# Patient Record
Sex: FEMALE | Race: WHITE | NOT HISPANIC OR LATINO | URBAN - METROPOLITAN AREA
[De-identification: names, ages, dates, MRNs, and addresses within clinical notes are randomized per-mention and may not be internally consistent; named-entity substitution may affect disease eponyms.]

---

## 2024-03-21 ENCOUNTER — NURSING HOME VISIT (OUTPATIENT)
Dept: POST ACUTE CARE | Facility: EXTERNAL LOCATION | Age: 74
End: 2024-03-21
Payer: COMMERCIAL

## 2024-03-21 DIAGNOSIS — R62.7 FAILURE TO THRIVE IN ADULT: ICD-10-CM

## 2024-03-21 DIAGNOSIS — R79.89 LOW SERUM T4 LEVEL: ICD-10-CM

## 2024-03-21 DIAGNOSIS — F32.4 MAJOR DEPRESSIVE DISORDER IN PARTIAL REMISSION, UNSPECIFIED WHETHER RECURRENT (CMS-HCC): Primary | ICD-10-CM

## 2024-03-21 DIAGNOSIS — F41.9 SEVERE ANXIETY: ICD-10-CM

## 2024-03-21 DIAGNOSIS — R31.9 URINARY TRACT INFECTION WITH HEMATURIA, SITE UNSPECIFIED: ICD-10-CM

## 2024-03-21 DIAGNOSIS — N39.0 URINARY TRACT INFECTION WITH HEMATURIA, SITE UNSPECIFIED: ICD-10-CM

## 2024-03-21 PROCEDURE — 99305 1ST NF CARE MODERATE MDM 35: CPT | Performed by: EMERGENCY MEDICINE

## 2024-03-21 PROCEDURE — 99497 ADVNCD CARE PLAN 30 MIN: CPT | Performed by: EMERGENCY MEDICINE

## 2024-03-21 NOTE — LETTER
Patient: Gracy Lion  : 1950    Encounter Date: 2024    Gracy Lion   73 y.o.  female  @location@            Assessment and Plan:  History and physical    Failure to thrive in adult R62.7    Depressive disorder, severe, recurrent, rule out bipolar disorder  Anxiety, unspecified    UTI (urinary tract infection) N39.0    Low serum T4 level R79.89    Hospital Course:  The patient was admitted to the [facility] and was involved in individual and group therapies, recreational and occupational therapies.      Mallory has shown incremental improvement throughout the course of her hospitalization.  Early on, she was frequently tearful.  She expressed concerns about the loss of her  a year ago and frequent urinary incontinence.  She was upset because she was not ambulating well.  She talked about being afraid to fall.  She has been worried about not only hurting herself, but hurting a staff members during a fall.    Mallory was admitted taking multiple medications for her depression.  Wellbutrin was discontinued due to lack of efficacy.  She has remained on Lexapro as she feels this has been most beneficial.  Hydroxyzine was discontinued.  She has been started on Remeron and has done well with this.  Additionally, her Abilify dosing has been reduced to 2 mg for augmentation of antidepressant medications.  With the changes in medications, Mallory has done better.  She is quite bright when out of her room and amongst peers.  She smiles, laughs and socializes.  She attends activities.  She still has some periods of tearfulness on occasion, but this has been much less.  Overall, she has been ambulating much better and has been getting stronger.    In the absence of any suicidality and any unmanageable behaviors, arrangements have been made for Mallory to go to [facility] for skilled nursing care.  Throughout the course of her hospitalization, she has denied any suicidal  thoughts.  She has reported her [employer] as an important protective factor protective factor.  Female gender, discharge to a supportive environment, no history of suicide attempts, no psychosis, no current substance abuse issues and so forth also service protective factors.    Medication List:  Abilify 2 mg oral tablet 2 mg = 1 tabs, ORAL, DAILY  acetaminophen 500 mg oral tablet 1,000 mg = 2 tabs, PRN, ORAL, [id number]  Acidophilus oral capsule 1 caps, ORAL, DAILY  Aspercreme with Lidocaine 4% topical cream 1 rosaline, PRN, Topical, BID  Aspirin EC 81 mg oral delayed release tablet 81 mg = 1 tabs, ORAL, DAILY  atenolol 50 mg oral tablet 50 mg = 1 tabs, ORAL, DAILY  bisacodyl 10 mg rectal suppository 10 mg = 1 supp, PRN, Rectal, DAILY  Colace 100 mg oral capsule 100 mg = 1 caps, PRN, ORAL, DAILY  Flonase 50 mcg/inh nasal spray 1 sprays, PRN, Nasal, DAILY  Klor-Con M20 oral tablet, extended release 20 mEq = 1 tabs, ORAL, DAILY  Lasix 20 mg oral tablet 20 mg = 1 tabs, ORAL, BID  latanoprost 0.005% ophthalmic solution 1 drops, Left Eye, QHS  Lexapro 20 mg oral tablet 20 mg = 1 tabs, ORAL, DAILY  Lopid 600 mg oral tablet 600 mg = 1 tabs, ORAL, DAILY  Melatonin 3 mg oral tablet 6 mg = 2 tabs, ORAL, QHS  nystatin 100,000 units/g topical powder 1 rosaline, Topical, BID  omeprazole 20 mg oral delayed release capsule 20 mg = 1 caps, ORAL, DAILY  Percocet 5 mg-325 mg oral tablet 1 tabs, PRN, ORAL, BID  Preparation H Hydrocortisone 1% topical cream 1 rosaline, PRN, Topical, QID  Reguloid 1 tsps, ORAL, DAILY  Remeron 15 mg oral tablet 15 mg = 1 tabs, ORAL, QHS  Secura Mosturizing topical cream 1 rosaline, Topical, QSHIFT  Secura Mosturizing topical cream 1 rosaline, Topical, PRN  Zestril 10 mg oral tablet 10 mg = 1 tabs, ORAL, DAILY  Zocor 10 mg oral tablet 10 mg = 1 tabs, ORAL, DAILY    I discussed advanced care planning including the explanation and discussion of advanced directives. If patient does not have current up to date documents,  examples and information provided on how to create both living will and power of . Patient was encouraged to work on completing these documents.  Information and advise was also provided on DO NOT RESUSCITATE and patient encouraged to consider this  Patient is not sure about DNR at this time.  CODE STATUS is on file with the facility    Source of history: Nurse, Medical personnel, Medical record, Patient.  History limitation: None.    HPI:  History and physical    Patient is unable to give any detailed history and therefore history is obtained from the chart  No acute complaints voiced by the patient or acute concerns raised by nursing    History of hospitalization-    History of Present Illness This is a 72 y/o female with a h/o UTI, HTN, hyperlipidemia, depression, failure to thrive, ESBL, who presents to the ED via EMS for failure to thrive pta. Patient is also being brought for weakness for over the past week, patient has been having a hard time recently because of her anniversary with her . Patient states a loss for appetite but denies any pain. Patient denies weight loss and states she is urinating and having okay BM. Patient denies SI. She states she's being treated for her breast rashes. Patient voices no other complaints.     Problem List/Past Medical History Ongoing Abdominal pain Acid reflux Acquired spondylolisthesis Allergy to substance Anemia Anxiety Aortocoronary bypass graft present Arthritis Cervical spondylosis with myelopathy Chronic back pain Chronic pain disorder Chronic right sacroiliac joint pain Coronary artery disease Depression with anxiety Diarrhea Diarrhea Dysphagia ESBL Escherichia coli Essential hypertension Excess skin of eyelid Fatigue Gallbladder disease Hyperlipidemia Hypertensive disorder Hypothyroidism Leg swelling Long term prescription opiate use Lower extremity weakness Lumbar spondylosis Noninfectious enteritis Old myocardial infarction Pain in limb Palliative  care encounter Spinal stenosis of lumbar region Urge incontinence of urine Urinary incontinence Urinary tract infectious disease Vitamin D deficiency Procedure/Surgical History   CYSTOSCOPY,INJECTION OF BOTOX INTO BLADDER (10/18/2021)   Cystoscopy with Collagen Injection. (2021)   CYSTOSCOPY, INJECTION OF BOTOX INTO BLADDER (2021)   CYSTOSCOPY, BILATERAL RETROGRADE PYELOGRAMS, URETHRAL DILATION (2021)   Cholecystectomy; (2017)   Cornea Transplant right eye (2016)   Toe surgery artfical joint   left knee replacement   bypass heart surgery   Cervical fusion  Adhesive tape allergy RASH No Known Medication Allergies lactose Social History   Alcohol - Denies Alcohol Use   Exercise - Occasional exercise   Home/Environment Lives with:Spouse *Living Situation Prior to AdmissionHome/Independent Home equipment:Walker/Cane Monitoring Equipment in homeNone *Special Services and Community Resources Prior to AdmissionNone *Mobility Assistance Prior to AdmissionIndependent Home BarriersFalls *Will patient require additional/new services upon discharge?Yes   Other Name:Personal safety: feels safe at home   Sexual - No Sexual Activity   Substance Abuse - Denies Substance Abuse   Tobacco Use:Former smoker, quit more than 30 days ago Family History Heart disease: Mother and Father. Health Status Family Member(s)  Family Member(s) Relationship: Father, Age: Unknown Relationship: Mother, Age: Unknown      Physical Exam:  Vital signs as per nursing/MA documentation were reviewed  General appearance: Alert and in no acute distress  HEENT: Normal Inspection  Neck - Normal Inspection  Respiratory : No respiratory distress. Lungs are clear   Cardiovascular: heart rate normal. No gallop  Back - normal inspection  Skin inspection:Warm  Musculoskeletal : No deformities  Neuro : Limited exam. Baseline    ROS: Review of symptoms is negative except for what is mentioned in HPI    Results/Data  Records including but  not limited to electronic medical records, relevant lab work and imaging from patient's health care facility encounter were reviewed and independently verified      Charting was completed using voice recognition technology and may include unintended errors.    Discussed with patient/family, RN, and NP.      Electronically Signed By: Milind Schroeder MD   3/21/24  5:58 PM

## 2024-03-21 NOTE — PROGRESS NOTES
Gracy Lion   73 y.o.  female  @location@            Assessment and Plan:  History and physical    Failure to thrive in adult R62.7    Depressive disorder, severe, recurrent, rule out bipolar disorder  Anxiety, unspecified    UTI (urinary tract infection) N39.0    Low serum T4 level R79.89    Hospital Course:  The patient was admitted to the [facility] Unit and was involved in individual and group therapies, recreational and occupational therapies.      Mallory has shown incremental improvement throughout the course of her hospitalization.  Early on, she was frequently tearful.  She expressed concerns about the loss of her  a year ago and frequent urinary incontinence.  She was upset because she was not ambulating well.  She talked about being afraid to fall.  She has been worried about not only hurting herself, but hurting a staff members during a fall.    Mallory was admitted taking multiple medications for her depression.  Wellbutrin was discontinued due to lack of efficacy.  She has remained on Lexapro as she feels this has been most beneficial.  Hydroxyzine was discontinued.  She has been started on Remeron and has done well with this.  Additionally, her Abilify dosing has been reduced to 2 mg for augmentation of antidepressant medications.  With the changes in medications, Mallory has done better.  She is quite bright when out of her room and amongst peers.  She smiles, laughs and socializes.  She attends activities.  She still has some periods of tearfulness on occasion, but this has been much less.  Overall, she has been ambulating much better and has been getting stronger.    In the absence of any suicidality and any unmanageable behaviors, arrangements have been made for Mallory to go to [facility] for skilled nursing care.  Throughout the course of her hospitalization, she has denied any suicidal thoughts.  She has reported her [employer] as an important protective  factor protective factor.  Female gender, discharge to a supportive environment, no history of suicide attempts, no psychosis, no current substance abuse issues and so forth also service protective factors.    Medication List:  Abilify 2 mg oral tablet 2 mg = 1 tabs, ORAL, DAILY  acetaminophen 500 mg oral tablet 1,000 mg = 2 tabs, PRN, ORAL, [id number]  Acidophilus oral capsule 1 caps, ORAL, DAILY  Aspercreme with Lidocaine 4% topical cream 1 rosaline, PRN, Topical, BID  Aspirin EC 81 mg oral delayed release tablet 81 mg = 1 tabs, ORAL, DAILY  atenolol 50 mg oral tablet 50 mg = 1 tabs, ORAL, DAILY  bisacodyl 10 mg rectal suppository 10 mg = 1 supp, PRN, Rectal, DAILY  Colace 100 mg oral capsule 100 mg = 1 caps, PRN, ORAL, DAILY  Flonase 50 mcg/inh nasal spray 1 sprays, PRN, Nasal, DAILY  Klor-Con M20 oral tablet, extended release 20 mEq = 1 tabs, ORAL, DAILY  Lasix 20 mg oral tablet 20 mg = 1 tabs, ORAL, BID  latanoprost 0.005% ophthalmic solution 1 drops, Left Eye, QHS  Lexapro 20 mg oral tablet 20 mg = 1 tabs, ORAL, DAILY  Lopid 600 mg oral tablet 600 mg = 1 tabs, ORAL, DAILY  Melatonin 3 mg oral tablet 6 mg = 2 tabs, ORAL, QHS  nystatin 100,000 units/g topical powder 1 rosaline, Topical, BID  omeprazole 20 mg oral delayed release capsule 20 mg = 1 caps, ORAL, DAILY  Percocet 5 mg-325 mg oral tablet 1 tabs, PRN, ORAL, BID  Preparation H Hydrocortisone 1% topical cream 1 rosaline, PRN, Topical, QID  Reguloid 1 tsps, ORAL, DAILY  Remeron 15 mg oral tablet 15 mg = 1 tabs, ORAL, QHS  Secura Mosturizing topical cream 1 rosaline, Topical, QSHIFT  Secura Mosturizing topical cream 1 rosaline, Topical, PRN  Zestril 10 mg oral tablet 10 mg = 1 tabs, ORAL, DAILY  Zocor 10 mg oral tablet 10 mg = 1 tabs, ORAL, DAILY    I discussed advanced care planning including the explanation and discussion of advanced directives. If patient does not have current up to date documents, examples and information provided on how to create both living will and power of  . Patient was encouraged to work on completing these documents.  Information and advise was also provided on DO NOT RESUSCITATE and patient encouraged to consider this  Patient is not sure about DNR at this time.  CODE STATUS is on file with the facility    Source of history: Nurse, Medical personnel, Medical record, Patient.  History limitation: None.    HPI:  History and physical    Patient is unable to give any detailed history and therefore history is obtained from the chart  No acute complaints voiced by the patient or acute concerns raised by nursing    History of hospitalization-    History of Present Illness This is a 72 y/o female with a h/o UTI, HTN, hyperlipidemia, depression, failure to thrive, ESBL, who presents to the ED via EMS for failure to thrive pta. Patient is also being brought for weakness for over the past week, patient has been having a hard time recently because of her anniversary with her . Patient states a loss for appetite but denies any pain. Patient denies weight loss and states she is urinating and having okay BM. Patient denies SI. She states she's being treated for her breast rashes. Patient voices no other complaints.     Problem List/Past Medical History Ongoing Abdominal pain Acid reflux Acquired spondylolisthesis Allergy to substance Anemia Anxiety Aortocoronary bypass graft present Arthritis Cervical spondylosis with myelopathy Chronic back pain Chronic pain disorder Chronic right sacroiliac joint pain Coronary artery disease Depression with anxiety Diarrhea Diarrhea Dysphagia ESBL Escherichia coli Essential hypertension Excess skin of eyelid Fatigue Gallbladder disease Hyperlipidemia Hypertensive disorder Hypothyroidism Leg swelling Long term prescription opiate use Lower extremity weakness Lumbar spondylosis Noninfectious enteritis Old myocardial infarction Pain in limb Palliative care encounter Spinal stenosis of lumbar region Urge incontinence of urine Urinary  incontinence Urinary tract infectious disease Vitamin D deficiency Procedure/Surgical History   CYSTOSCOPY,INJECTION OF BOTOX INTO BLADDER (10/18/2021)   Cystoscopy with Collagen Injection. (2021)   CYSTOSCOPY, INJECTION OF BOTOX INTO BLADDER (2021)   CYSTOSCOPY, BILATERAL RETROGRADE PYELOGRAMS, URETHRAL DILATION (2021)   Cholecystectomy; (2017)   Cornea Transplant right eye (2016)   Toe surgery artfical joint   left knee replacement   bypass heart surgery   Cervical fusion  Adhesive tape allergy RASH No Known Medication Allergies lactose Social History   Alcohol - Denies Alcohol Use   Exercise - Occasional exercise   Home/Environment Lives with:Spouse *Living Situation Prior to AdmissionHome/Independent Home equipment:Walker/Cane Monitoring Equipment in homeNone *Special Services and Community Resources Prior to AdmissionNone *Mobility Assistance Prior to AdmissionIndependent Home BarriersFalls *Will patient require additional/new services upon discharge?Yes   Other Name:Personal safety: feels safe at home   Sexual - No Sexual Activity   Substance Abuse - Denies Substance Abuse   Tobacco Use:Former smoker, quit more than 30 days ago Family History Heart disease: Mother and Father. Health Status Family Member(s)  Family Member(s) Relationship: Father, Age: Unknown Relationship: Mother, Age: Unknown      Physical Exam:  Vital signs as per nursing/MA documentation were reviewed  General appearance: Alert and in no acute distress  HEENT: Normal Inspection  Neck - Normal Inspection  Respiratory : No respiratory distress. Lungs are clear   Cardiovascular: heart rate normal. No gallop  Back - normal inspection  Skin inspection:Warm  Musculoskeletal : No deformities  Neuro : Limited exam. Baseline    ROS: Review of symptoms is negative except for what is mentioned in HPI    Results/Data  Records including but not limited to electronic medical records, relevant lab work and imaging from  patient's health care facility encounter were reviewed and independently verified      Charting was completed using voice recognition technology and may include unintended errors.    Discussed with patient/family, RN, and NP.

## 2024-06-18 ENCOUNTER — LAB REQUISITION (OUTPATIENT)
Dept: LAB | Facility: HOSPITAL | Age: 74
End: 2024-06-18
Payer: MEDICARE

## 2024-06-18 DIAGNOSIS — R62.7 ADULT FAILURE TO THRIVE: ICD-10-CM

## 2024-06-18 DIAGNOSIS — M48.00 SPINAL STENOSIS, SITE UNSPECIFIED: ICD-10-CM

## 2024-06-18 DIAGNOSIS — M51.36 OTHER INTERVERTEBRAL DISC DEGENERATION, LUMBAR REGION: ICD-10-CM

## 2024-06-18 DIAGNOSIS — F33.2 MAJOR DEPRESSIVE DISORDER, RECURRENT SEVERE WITHOUT PSYCHOTIC FEATURES (MULTI): ICD-10-CM

## 2024-06-18 DIAGNOSIS — F41.9 ANXIETY DISORDER, UNSPECIFIED: ICD-10-CM

## 2024-06-18 DIAGNOSIS — M43.22 FUSION OF SPINE, CERVICAL REGION: ICD-10-CM

## 2024-06-18 DIAGNOSIS — K21.9 GASTRO-ESOPHAGEAL REFLUX DISEASE WITHOUT ESOPHAGITIS: ICD-10-CM

## 2024-06-18 LAB
ANION GAP SERPL CALC-SCNC: 12 MMOL/L (ref 10–20)
BUN SERPL-MCNC: 18 MG/DL (ref 6–23)
CALCIUM SERPL-MCNC: 9 MG/DL (ref 8.6–10.3)
CHLORIDE SERPL-SCNC: 106 MMOL/L (ref 98–107)
CHOLEST SERPL-MCNC: 153 MG/DL (ref 0–199)
CHOLESTEROL/HDL RATIO: 4.6
CO2 SERPL-SCNC: 25 MMOL/L (ref 21–32)
CREAT SERPL-MCNC: 0.64 MG/DL (ref 0.5–1.05)
EGFRCR SERPLBLD CKD-EPI 2021: >90 ML/MIN/1.73M*2
ERYTHROCYTE [DISTWIDTH] IN BLOOD BY AUTOMATED COUNT: 13.6 % (ref 11.5–14.5)
EST. AVERAGE GLUCOSE BLD GHB EST-MCNC: 111 MG/DL
GLUCOSE SERPL-MCNC: 90 MG/DL (ref 74–99)
HBA1C MFR BLD: 5.5 %
HCT VFR BLD AUTO: 39.2 % (ref 36–46)
HDLC SERPL-MCNC: 33 MG/DL
HGB BLD-MCNC: 13.4 G/DL (ref 12–16)
LDLC SERPL CALC-MCNC: 90 MG/DL
MCH RBC QN AUTO: 29.6 PG (ref 26–34)
MCHC RBC AUTO-ENTMCNC: 34.2 G/DL (ref 32–36)
MCV RBC AUTO: 87 FL (ref 80–100)
NON HDL CHOLESTEROL: 120 MG/DL (ref 0–149)
NRBC BLD-RTO: 0 /100 WBCS (ref 0–0)
PLATELET # BLD AUTO: 164 X10*3/UL (ref 150–450)
POTASSIUM SERPL-SCNC: 4.3 MMOL/L (ref 3.5–5.3)
RBC # BLD AUTO: 4.52 X10*6/UL (ref 4–5.2)
SODIUM SERPL-SCNC: 139 MMOL/L (ref 136–145)
TRIGL SERPL-MCNC: 149 MG/DL (ref 0–149)
TSH SERPL-ACNC: 2.39 MIU/L (ref 0.44–3.98)
VLDL: 30 MG/DL (ref 0–40)
WBC # BLD AUTO: 8.6 X10*3/UL (ref 4.4–11.3)

## 2024-06-18 PROCEDURE — 84443 ASSAY THYROID STIM HORMONE: CPT | Mod: OUT | Performed by: NURSE PRACTITIONER

## 2024-06-18 PROCEDURE — 85027 COMPLETE CBC AUTOMATED: CPT | Mod: OUT | Performed by: NURSE PRACTITIONER

## 2024-06-18 PROCEDURE — 80048 BASIC METABOLIC PNL TOTAL CA: CPT | Mod: OUT | Performed by: NURSE PRACTITIONER

## 2024-06-18 PROCEDURE — 82306 VITAMIN D 25 HYDROXY: CPT | Mod: OUT,PARLAB | Performed by: NURSE PRACTITIONER

## 2024-06-18 PROCEDURE — 83036 HEMOGLOBIN GLYCOSYLATED A1C: CPT | Mod: OUT | Performed by: NURSE PRACTITIONER

## 2024-06-18 PROCEDURE — 80061 LIPID PANEL: CPT | Mod: OUT | Performed by: NURSE PRACTITIONER

## 2024-06-19 LAB — 25(OH)D3 SERPL-MCNC: 38 NG/ML (ref 30–100)

## 2024-12-23 ENCOUNTER — APPOINTMENT (OUTPATIENT)
Dept: VASCULAR MEDICINE | Facility: HOSPITAL | Age: 74
End: 2024-12-23
Payer: MEDICARE

## 2024-12-23 ENCOUNTER — HOSPITAL ENCOUNTER (EMERGENCY)
Facility: HOSPITAL | Age: 74
Discharge: HOME | End: 2024-12-23
Attending: GENERAL PRACTICE
Payer: MEDICARE

## 2024-12-23 VITALS
SYSTOLIC BLOOD PRESSURE: 122 MMHG | TEMPERATURE: 97.9 F | HEART RATE: 60 BPM | RESPIRATION RATE: 18 BRPM | DIASTOLIC BLOOD PRESSURE: 77 MMHG | OXYGEN SATURATION: 98 %

## 2024-12-23 DIAGNOSIS — R60.0 LOCALIZED EDEMA: ICD-10-CM

## 2024-12-23 DIAGNOSIS — L03.115 CELLULITIS OF RIGHT LOWER EXTREMITY: Primary | ICD-10-CM

## 2024-12-23 PROCEDURE — 93971 EXTREMITY STUDY: CPT

## 2024-12-23 PROCEDURE — 2500000002 HC RX 250 W HCPCS SELF ADMINISTERED DRUGS (ALT 637 FOR MEDICARE OP, ALT 636 FOR OP/ED)

## 2024-12-23 PROCEDURE — 93971 EXTREMITY STUDY: CPT | Performed by: SURGERY

## 2024-12-23 PROCEDURE — 99284 EMERGENCY DEPT VISIT MOD MDM: CPT | Mod: 25 | Performed by: GENERAL PRACTICE

## 2024-12-23 RX ORDER — SULFAMETHOXAZOLE AND TRIMETHOPRIM 800; 160 MG/1; MG/1
1 TABLET ORAL 2 TIMES DAILY
Qty: 14 TABLET | Refills: 0 | Status: SHIPPED | OUTPATIENT
Start: 2024-12-23 | End: 2024-12-30

## 2024-12-23 RX ORDER — SULFAMETHOXAZOLE AND TRIMETHOPRIM 800; 160 MG/1; MG/1
1 TABLET ORAL ONCE
Status: COMPLETED | OUTPATIENT
Start: 2024-12-23 | End: 2024-12-23

## 2024-12-23 ASSESSMENT — LIFESTYLE VARIABLES
TOTAL SCORE: 0
HAVE PEOPLE ANNOYED YOU BY CRITICIZING YOUR DRINKING: NO
EVER HAD A DRINK FIRST THING IN THE MORNING TO STEADY YOUR NERVES TO GET RID OF A HANGOVER: NO
HAVE YOU EVER FELT YOU SHOULD CUT DOWN ON YOUR DRINKING: NO
EVER FELT BAD OR GUILTY ABOUT YOUR DRINKING: NO

## 2024-12-23 ASSESSMENT — COLUMBIA-SUICIDE SEVERITY RATING SCALE - C-SSRS
1. IN THE PAST MONTH, HAVE YOU WISHED YOU WERE DEAD OR WISHED YOU COULD GO TO SLEEP AND NOT WAKE UP?: NO
6. HAVE YOU EVER DONE ANYTHING, STARTED TO DO ANYTHING, OR PREPARED TO DO ANYTHING TO END YOUR LIFE?: NO
2. HAVE YOU ACTUALLY HAD ANY THOUGHTS OF KILLING YOURSELF?: NO

## 2024-12-23 ASSESSMENT — PAIN - FUNCTIONAL ASSESSMENT: PAIN_FUNCTIONAL_ASSESSMENT: 0-10

## 2024-12-23 NOTE — ED TRIAGE NOTES
Pt to ED with C/o R leg swelling. Per pt it ha been swollen for some time but she thinks it is getting worse. Leg is red and swollen on arrival.

## 2024-12-23 NOTE — ED PROVIDER NOTES
Emergency Department Provider Note        History of Present Illness     CC: Leg Swelling     HPI:  This is a 74-year-old female who presents emergency department with right sided leg pain and swelling and redness.  She states that this has been going on for about 1 week.  States that her symptoms began initially as a blister that then burst did open.  Shortly after that her right leg around the blister started becoming red and painful.  She also notes warmth to it as well.  States that she does not walk at baseline and rather uses a wheelchair.  She denies any fevers, chills.  Denies any nausea or vomiting.  Denies any other symptoms at this time.    Limitations to history: None  Independent historian(s): None   Records Reviewed: Recent available ED and inpatient notes reviewed in EMR.    PMHx/PSHx:  Per HPI.   - has a past medical history of Personal history of other diseases of the circulatory system, Personal history of other diseases of the circulatory system, Personal history of other diseases of the circulatory system, Personal history of other diseases of the musculoskeletal system and connective tissue, and Personal history of other mental and behavioral disorders.  - has a past surgical history that includes Cholecystectomy (03/08/2017); Cervical discectomy (03/08/2017); Cardiac surgery (03/08/2017); Cataract extraction (03/08/2017); Knee surgery (03/08/2017); Eye surgery (03/08/2017); and Back surgery (03/08/2017).    Medications:  Reviewed in EMR. See EMR for complete list of medications and doses.    Allergies:  Patient has no known allergies.    Social History:  - Tobacco:  has no history on file for tobacco use.   - Alcohol:  has no history on file for alcohol use.   - Illicit Drugs:  has no history on file for drug use.     ROS:  Per HPI.       Physical Exam     Triage Vitals:  T 36.6 °C (97.9 °F)  HR 56  /77  RR 15  O2 99 %      General: Patient resting comfortably, no acute distress, and  appropriately conversational.   Head: Normocephalic. Atraumatic.  Neck: FROM. No gross masses.   Eyes: EOMI. Conjunctiva clear.   ENT: Moist mucous membranes, no apparent trauma or lesions.  CV: Regular rate. 2+ radial pulses bilaterally.  Normal S1 and S2 with no murmurs appreciated.  Resp: No respiratory distress, breathing easily. Speaks in full sentences.    GI: Soft, non-distended. No tenderness with palpation.   EXT: Right lower extremity with a large 4 cm diameter circular blister wound with surrounding erythema, warmth, and mild tenderness on palpation.  This area of erythema is relatively well-demarcated.  Skin: Warm and dry.  Neuro: Alert. No focal neurological deficits. Motor and sensation intact throughout. Speech fluent. Normal gait.   Psych: Appropriate mood and behavior, converses and responds appropriately.      Medical Decision Making & ED Course     Labs:   Labs Reviewed - No data to display     Imaging:   Vascular US lower extremity venous duplex right   Final Result           EKG:  None    MDM:  This is a 74-year-old female who presents to the emergency department for right lower extremity pain, redness and warmth.  She is hemodynamically stable and in no significant distress upon arrival.  Vital signs are within normal limits, mildly bradycardic at 56.  On physical exam she has got obvious signs of cellulitis which is most likely the cause of her symptoms and presentation today.  We will obtain a DVT ultrasound to rule out blood clot however have low suspicion for this.  No concern at this time for compartment syndrome or any other complication.  She started on Bactrim and a prescription is provided.  She is recommended to follow-up with her primary care doctor.  Return precautions are provided.  She expressed understanding and agreed with the plan.  She remained hemodynamically stable and will be discharged home.      ED Course:  ED Course as of 12/23/24 1653   Mon Dec 23, 2024   [id number] Vascular  US lower extremity venous duplex right  Negative for blood clot. [VM]      ED Course User Index  [VM] Shadi Oseguera DO         Diagnoses as of 12/23/24 1653   Cellulitis of right lower extremity       Independent Result Review and Interpretation: Relevant laboratory and radiographic results were reviewed and independently interpreted by myself.  As necessary, they are commented on in the ED Course.    Social Determinants Limiting Care:  None identified      Patient seen by and discussed with the attending emergency medicine physician.       Disposition    Discharge    Shadi Oseguera DO   Emergency Medicine PGY-3  [facility]      Procedures      Procedures ? Smart[employer] last updated 12/23/2024 4:53 PM          Shadi Oseguera DO  Resident  12/23/24 1654

## 2024-12-23 NOTE — DISCHARGE INSTRUCTIONS
.  Leg wound is consistent with cellulitis.  We started you on an antibiotic and provided a prescription that you will take for the next 7 days.  Please follow-up with your primary care doctor or the wound care clinic for reevaluation.  I ordered a referral to the wound care clinic if you would like.  They should be contacting you for an appointment over the next couple of days.  Please return to the emergency department if the symptoms are worsening including traveling outside of the marked area, worsening pain, swelling or redness, new onset fevers or systemic symptoms like vomiting, abdominal pain, severe headache.  Return to the emergency department for any new or worsening symptoms as well.

## 2025-01-07 ENCOUNTER — APPOINTMENT (OUTPATIENT)
Dept: WOUND CARE | Facility: CLINIC | Age: 75
End: 2025-01-07
Payer: MEDICARE